# Patient Record
Sex: FEMALE | Race: WHITE
[De-identification: names, ages, dates, MRNs, and addresses within clinical notes are randomized per-mention and may not be internally consistent; named-entity substitution may affect disease eponyms.]

---

## 2024-09-12 ENCOUNTER — NON-APPOINTMENT (OUTPATIENT)
Age: 69
End: 2024-09-12

## 2024-09-13 ENCOUNTER — APPOINTMENT (OUTPATIENT)
Dept: OTOLARYNGOLOGY | Facility: CLINIC | Age: 69
End: 2024-09-13
Payer: MEDICARE

## 2024-09-13 VITALS
WEIGHT: 128 LBS | OXYGEN SATURATION: 96 % | HEIGHT: 64 IN | SYSTOLIC BLOOD PRESSURE: 119 MMHG | HEART RATE: 64 BPM | BODY MASS INDEX: 21.85 KG/M2 | DIASTOLIC BLOOD PRESSURE: 82 MMHG

## 2024-09-13 DIAGNOSIS — Z80.0 FAMILY HISTORY OF MALIGNANT NEOPLASM OF DIGESTIVE ORGANS: ICD-10-CM

## 2024-09-13 DIAGNOSIS — Z83.3 FAMILY HISTORY OF DIABETES MELLITUS: ICD-10-CM

## 2024-09-13 DIAGNOSIS — Z78.9 OTHER SPECIFIED HEALTH STATUS: ICD-10-CM

## 2024-09-13 DIAGNOSIS — Z86.79 PERSONAL HISTORY OF OTHER DISEASES OF THE CIRCULATORY SYSTEM: ICD-10-CM

## 2024-09-13 DIAGNOSIS — Z82.49 FAMILY HISTORY OF ISCHEMIC HEART DISEASE AND OTHER DISEASES OF THE CIRCULATORY SYSTEM: ICD-10-CM

## 2024-09-13 DIAGNOSIS — G47.33 OBSTRUCTIVE SLEEP APNEA (ADULT) (PEDIATRIC): ICD-10-CM

## 2024-09-13 PROCEDURE — 99204 OFFICE O/P NEW MOD 45 MIN: CPT | Mod: 25,57

## 2024-09-13 PROCEDURE — 31575 DIAGNOSTIC LARYNGOSCOPY: CPT

## 2024-09-13 RX ORDER — MAGNESIUM OXIDE/MAG AA CHELATE 300 MG
CAPSULE ORAL
Refills: 0 | Status: ACTIVE | COMMUNITY

## 2024-09-13 RX ORDER — ASCORBIC ACID 500 MG
TABLET ORAL
Refills: 0 | Status: ACTIVE | COMMUNITY

## 2024-09-13 RX ORDER — RIVAROXABAN 2.5 MG/1
TABLET, FILM COATED ORAL
Refills: 0 | Status: ACTIVE | COMMUNITY

## 2024-09-13 RX ORDER — STRONTIUM CHLORIDE HEXAHYDRATE 100 %
CRYSTALS MISCELLANEOUS
Refills: 0 | Status: ACTIVE | COMMUNITY

## 2024-09-13 RX ORDER — BIOTIN 10 MG
TABLET ORAL
Refills: 0 | Status: ACTIVE | COMMUNITY

## 2024-09-13 RX ORDER — POTASSIUM CHLORIDE 750 MG/1
10 TABLET, FILM COATED, EXTENDED RELEASE ORAL
Refills: 0 | Status: ACTIVE | COMMUNITY

## 2024-09-13 RX ORDER — VITAMIN E ACID SUCCINATE 268 MG
TABLET ORAL
Refills: 0 | Status: ACTIVE | COMMUNITY

## 2024-09-13 RX ORDER — COLD-HOT PACK
EACH MISCELLANEOUS
Refills: 0 | Status: ACTIVE | COMMUNITY

## 2024-09-13 RX ORDER — CHROMIUM 200 MCG
TABLET ORAL
Refills: 0 | Status: ACTIVE | COMMUNITY

## 2024-09-13 RX ORDER — ZOLPIDEM TARTRATE 5 MG/1
TABLET, FILM COATED ORAL
Refills: 0 | Status: ACTIVE | COMMUNITY

## 2024-09-13 RX ORDER — METOPROLOL TARTRATE 75 MG/1
TABLET, FILM COATED ORAL
Refills: 0 | Status: ACTIVE | COMMUNITY

## 2024-09-13 NOTE — REASON FOR VISIT
[Initial Consultation] : an initial consultation for [FreeTextEntry2] : referred by Dr. Jermaine Benson, pulmonologist, for obstructive sleep apnea

## 2024-09-13 NOTE — CONSULT LETTER
[Dear  ___] : Dear  [unfilled], [Consult Letter:] : I had the pleasure of evaluating your patient, [unfilled]. [Please see my note below.] : Please see my note below. [Consult Closing:] : Thank you very much for allowing me to participate in the care of this patient.  If you have any questions, please do not hesitate to contact me. [Sincerely,] : Sincerely, [FreeTextEntry2] : Jermaine Benson [FreeTextEntry3] : García Mc MD, JEANIE, FACS  Department Otolaryngology Director of [facility] Professor of Otolaryngology,  [facility] [place] [employer] [facility]

## 2024-09-13 NOTE — ADDENDUM
[FreeTextEntry1] :  scribe attestation; I, Be Cox, am scribing for and in the presence of Dr. García Mc in the following sections HISTORY OF PRESENT ILLNESS, PAST MEDICAL/FAMILY/SOCIAL HISTORY; REVIEW OF SYSTEMS; VITAL SIGNS; PHYSICAL EXAM; PROCEDURES; DISCUSSION.   I, Dr. García Mc, personally performed the services described in the documentation, reviewed the documentation recorded by the scribe in my presence, and it accurately and completely records my words and actions.

## 2024-09-13 NOTE — HISTORY OF PRESENT ILLNESS
[de-identified] : 69 year old female referred by Dr. Jermaine Benson, pulmonologist, for obstructive sleep apnea.  States had a sleep study June, 2024, we are awaiting results.  States she was told she has severe sleep apnea.   Denies CPAP. Patient states she has been on Flonase, with minimal relief.  [Snoring] : no snoring [Witnessed Apneas] : witnessed sleep apnea [Frequent Nocturnal Awakening] : frequent nocturnal awakening [Daytime Somnolence] : no daytime somnolence [Unintentional Sleep while Active] : no unintentional sleep while active [Unintentional Sleep While Inactive] : no unintentional sleep while inactive [Awakes Unrefreshed] : awakening unrefreshed [Awakes with Headache] : no headache upon awakening [Awakening With Dry Mouth] : awakening with dry mouth [Recent  Weight Gain] : recent weight gain

## 2024-09-13 NOTE — PROCEDURE
[Image(s) Captured] : image(s) captured and filed [Video Captured] : video captured and filed [Topical Lidocaine] : topical lidocaine [Oxymetazoline HCl] : oxymetazoline HCl [Flexible Endoscope] : examined with the flexible endoscope [Serial Number: ___] : Serial Number: [unfilled] [Unable to Cooperate with Mirror] : patient unable to cooperate with mirror [Complicated Symptoms] : complicated symptoms requiring more thorough examination than provided by mirror [Normal] : posterior cricoid area had healthy pink mucosa in the interarytenoid area and the esophageal inlet [de-identified] : Patient was placed in the examination chair in a sitting position. The nose was decongested with oxymetazoline nasal solution. The airway was anesthetized with 4% Xylocaine.  The back of the throat was anesthetized with Cetacaine. Direct flexible/rigid video endoscopy was performed. Findings revealed: deviated septum to the left, turbinate hypertrophy, larynx, epiglottis, and vocal cords are normal.

## 2024-09-18 DIAGNOSIS — J34.2 DEVIATED NASAL SEPTUM: ICD-10-CM

## 2024-12-04 ENCOUNTER — OUTPATIENT (OUTPATIENT)
Dept: OUTPATIENT SERVICES | Facility: HOSPITAL | Age: 69
LOS: 1 days | End: 2024-12-04
Payer: MEDICARE

## 2024-12-04 VITALS
HEART RATE: 66 BPM | OXYGEN SATURATION: 98 % | TEMPERATURE: 98 F | RESPIRATION RATE: 18 BRPM | DIASTOLIC BLOOD PRESSURE: 78 MMHG | WEIGHT: 127.43 LBS | SYSTOLIC BLOOD PRESSURE: 147 MMHG | HEIGHT: 65 IN

## 2024-12-04 DIAGNOSIS — I10 ESSENTIAL (PRIMARY) HYPERTENSION: ICD-10-CM

## 2024-12-04 DIAGNOSIS — Z98.890 OTHER SPECIFIED POSTPROCEDURAL STATES: Chronic | ICD-10-CM

## 2024-12-04 DIAGNOSIS — Z98.49 CATARACT EXTRACTION STATUS, UNSPECIFIED EYE: Chronic | ICD-10-CM

## 2024-12-04 DIAGNOSIS — I48.91 UNSPECIFIED ATRIAL FIBRILLATION: ICD-10-CM

## 2024-12-04 DIAGNOSIS — Z95.818 PRESENCE OF OTHER CARDIAC IMPLANTS AND GRAFTS: ICD-10-CM

## 2024-12-04 DIAGNOSIS — J34.2 DEVIATED NASAL SEPTUM: ICD-10-CM

## 2024-12-04 DIAGNOSIS — Z01.818 ENCOUNTER FOR OTHER PREPROCEDURAL EXAMINATION: ICD-10-CM

## 2024-12-04 DIAGNOSIS — G47.33 OBSTRUCTIVE SLEEP APNEA (ADULT) (PEDIATRIC): ICD-10-CM

## 2024-12-04 LAB
ANION GAP SERPL CALC-SCNC: 8 MMOL/L — SIGNIFICANT CHANGE UP (ref 5–17)
BUN SERPL-MCNC: 17 MG/DL — SIGNIFICANT CHANGE UP (ref 7–23)
CALCIUM SERPL-MCNC: 9.5 MG/DL — SIGNIFICANT CHANGE UP (ref 8.4–10.5)
CHLORIDE SERPL-SCNC: 104 MMOL/L — SIGNIFICANT CHANGE UP (ref 96–108)
CO2 SERPL-SCNC: 30 MMOL/L — SIGNIFICANT CHANGE UP (ref 22–31)
CREAT SERPL-MCNC: 0.89 MG/DL — SIGNIFICANT CHANGE UP (ref 0.5–1.3)
EGFR: 70 ML/MIN/1.73M2 — SIGNIFICANT CHANGE UP
GLUCOSE SERPL-MCNC: 84 MG/DL — SIGNIFICANT CHANGE UP (ref 70–99)
HCT VFR BLD CALC: 40.7 % — SIGNIFICANT CHANGE UP (ref 34.5–45)
HGB BLD-MCNC: 13.7 G/DL — SIGNIFICANT CHANGE UP (ref 11.5–15.5)
MCHC RBC-ENTMCNC: 31.4 PG — SIGNIFICANT CHANGE UP (ref 27–34)
MCHC RBC-ENTMCNC: 33.7 G/DL — SIGNIFICANT CHANGE UP (ref 32–36)
MCV RBC AUTO: 93.3 FL — SIGNIFICANT CHANGE UP (ref 80–100)
NRBC # BLD: 0 /100 WBCS — SIGNIFICANT CHANGE UP (ref 0–0)
PLATELET # BLD AUTO: 348 K/UL — SIGNIFICANT CHANGE UP (ref 150–400)
POTASSIUM SERPL-MCNC: 4.3 MMOL/L — SIGNIFICANT CHANGE UP (ref 3.5–5.3)
POTASSIUM SERPL-SCNC: 4.3 MMOL/L — SIGNIFICANT CHANGE UP (ref 3.5–5.3)
RBC # BLD: 4.36 M/UL — SIGNIFICANT CHANGE UP (ref 3.8–5.2)
RBC # FLD: 12.5 % — SIGNIFICANT CHANGE UP (ref 10.3–14.5)
SODIUM SERPL-SCNC: 142 MMOL/L — SIGNIFICANT CHANGE UP (ref 135–145)
WBC # BLD: 4.05 K/UL — SIGNIFICANT CHANGE UP (ref 3.8–10.5)
WBC # FLD AUTO: 4.05 K/UL — SIGNIFICANT CHANGE UP (ref 3.8–10.5)

## 2024-12-04 PROCEDURE — 85027 COMPLETE CBC AUTOMATED: CPT

## 2024-12-04 PROCEDURE — 80048 BASIC METABOLIC PNL TOTAL CA: CPT

## 2024-12-04 PROCEDURE — G0463: CPT

## 2024-12-04 PROCEDURE — 36415 COLL VENOUS BLD VENIPUNCTURE: CPT

## 2024-12-04 RX ORDER — 0.9 % SODIUM CHLORIDE 0.9 %
1000 INTRAVENOUS SOLUTION INTRAVENOUS
Refills: 0 | Status: DISCONTINUED | OUTPATIENT
Start: 2024-12-09 | End: 2024-12-23

## 2024-12-04 NOTE — H&P PST ADULT - NSICDXPASTSURGICALHX_GEN_ALL_CORE_FT
PAST SURGICAL HISTORY:  History of bunionectomy of left great toe     History of cataract surgery     S/P LASIK surgery

## 2024-12-04 NOTE — H&P PST ADULT - NSICDXFAMILYHX_GEN_ALL_CORE_FT
FAMILY HISTORY:  Family history of diabetes mellitus (DM)  Family history of heart disease  Family history stroke in brother  FH: HTN (hypertension)

## 2024-12-04 NOTE — H&P PST ADULT - PROBLEM SELECTOR PLAN 1
Patient provided with pre-operative instructions and verbalized understanding.  Patient can take metoprolol but otherwise will be NPO on day of surgery. Patient will stop NSAIDs, aspirin, herbal supplements or vitamins 1 week prior to surgery.     Pending cardiac clearance as per surgeon.

## 2024-12-04 NOTE — H&P PST ADULT - HISTORY OF PRESENT ILLNESS
Patient is a [age] year old M/F with PMHx of __ or no pertinent medical history presents for perioperative testing for _____ with  ___ scheduled for /2024. Reports___. Denies ___ or any acute symptoms at this time. Patient otherwise feels well overall.    Patient is a 69 year old F presents for perioperative testing for septoplasty, turbinectomy, DISE with Dr. Mc scheduled for 12/09/2024. Reports having sleep apnea, unable to tolerate CPAP, therefore having upcoming surgery to help her breathe through her nose and possible future inspire placement. Deniesany acute symptoms at this time. Patient otherwise feels well overall.

## 2024-12-04 NOTE — H&P PST ADULT - NSICDXPASTMEDICALHX_GEN_ALL_CORE_FT
PAST MEDICAL HISTORY:  Afib     DNS (deviated nasal septum)     HTN (hypertension)     Implantable loop recorder present     MICHAEL (obstructive sleep apnea)

## 2024-12-04 NOTE — H&P PST ADULT - PROBLEM SELECTOR PLAN 4
continue medications as prescribed.     last dose XARELTO 11/25 as pe paperwork provided by surgeon- stated by pt

## 2024-12-09 ENCOUNTER — APPOINTMENT (OUTPATIENT)
Dept: OTOLARYNGOLOGY | Facility: HOSPITAL | Age: 69
End: 2024-12-09
Payer: MEDICARE

## 2024-12-09 ENCOUNTER — TRANSCRIPTION ENCOUNTER (OUTPATIENT)
Age: 69
End: 2024-12-09

## 2024-12-09 ENCOUNTER — OUTPATIENT (OUTPATIENT)
Dept: OUTPATIENT SERVICES | Facility: HOSPITAL | Age: 69
LOS: 1 days | End: 2024-12-09
Payer: MEDICARE

## 2024-12-09 ENCOUNTER — RESULT REVIEW (OUTPATIENT)
Age: 69
End: 2024-12-09

## 2024-12-09 VITALS
DIASTOLIC BLOOD PRESSURE: 81 MMHG | WEIGHT: 127.43 LBS | SYSTOLIC BLOOD PRESSURE: 120 MMHG | RESPIRATION RATE: 15 BRPM | HEART RATE: 60 BPM | OXYGEN SATURATION: 97 % | HEIGHT: 65 IN | TEMPERATURE: 98 F

## 2024-12-09 DIAGNOSIS — Z98.890 OTHER SPECIFIED POSTPROCEDURAL STATES: Chronic | ICD-10-CM

## 2024-12-09 DIAGNOSIS — G47.33 OBSTRUCTIVE SLEEP APNEA (ADULT) (PEDIATRIC): ICD-10-CM

## 2024-12-09 DIAGNOSIS — Z98.49 CATARACT EXTRACTION STATUS, UNSPECIFIED EYE: Chronic | ICD-10-CM

## 2024-12-09 PROCEDURE — 30130 EXCISE INFERIOR TURBINATE: CPT | Mod: 50

## 2024-12-09 PROCEDURE — 42975 DISE EVAL SLP DO BRTH FLX DX: CPT

## 2024-12-09 PROCEDURE — ZZZZZ: CPT

## 2024-12-09 PROCEDURE — 88300 SURGICAL PATH GROSS: CPT | Mod: 26

## 2024-12-09 PROCEDURE — 30520 REPAIR OF NASAL SEPTUM: CPT

## 2024-12-09 RX ORDER — INFLUENZA VIRUS VACCINE 15; 15; 15; 15 UG/.5ML; UG/.5ML; UG/.5ML; UG/.5ML
0.5 SUSPENSION INTRAMUSCULAR ONCE
Refills: 0 | Status: DISCONTINUED | OUTPATIENT
Start: 2024-12-09 | End: 2024-12-23

## 2024-12-09 RX ORDER — METOPROLOL TARTRATE 100 MG/1
50 TABLET, FILM COATED ORAL DAILY
Refills: 0 | Status: DISCONTINUED | OUTPATIENT
Start: 2024-12-09 | End: 2024-12-23

## 2024-12-09 RX ORDER — ONDANSETRON HYDROCHLORIDE 4 MG/1
4 TABLET, FILM COATED ORAL ONCE
Refills: 0 | Status: DISCONTINUED | OUTPATIENT
Start: 2024-12-09 | End: 2024-12-09

## 2024-12-09 RX ORDER — ACETAMINOPHEN 500MG 500 MG/1
650 TABLET, COATED ORAL EVERY 6 HOURS
Refills: 0 | Status: DISCONTINUED | OUTPATIENT
Start: 2024-12-09 | End: 2024-12-23

## 2024-12-09 RX ORDER — CEFAZOLIN SODIUM 10 G
2000 VIAL (EA) INJECTION EVERY 8 HOURS
Refills: 0 | Status: COMPLETED | OUTPATIENT
Start: 2024-12-09 | End: 2024-12-10

## 2024-12-09 RX ORDER — METOPROLOL TARTRATE 100 MG/1
1 TABLET, FILM COATED ORAL
Refills: 0 | DISCHARGE

## 2024-12-09 RX ORDER — CEPHALEXIN 500 MG
1 CAPSULE ORAL
Qty: 28 | Refills: 0
Start: 2024-12-09 | End: 2024-12-15

## 2024-12-09 RX ORDER — TRAMADOL HYDROCHLORIDE 300 MG/1
1 CAPSULE ORAL
Qty: 12 | Refills: 0
Start: 2024-12-09 | End: 2024-12-11

## 2024-12-09 RX ORDER — BENZOCAINE, MENTHOL 15; 3.6 MG/1; MG/1
1 LOZENGE ORAL ONCE
Refills: 0 | Status: COMPLETED | OUTPATIENT
Start: 2024-12-09 | End: 2024-12-09

## 2024-12-09 RX ORDER — 0.9 % SODIUM CHLORIDE 0.9 %
1000 INTRAVENOUS SOLUTION INTRAVENOUS
Refills: 0 | Status: DISCONTINUED | OUTPATIENT
Start: 2024-12-09 | End: 2024-12-09

## 2024-12-09 RX ORDER — HYDROMORPHONE HYDROCHLORIDE 2 MG/1
0.5 TABLET ORAL
Refills: 0 | Status: DISCONTINUED | OUTPATIENT
Start: 2024-12-09 | End: 2024-12-09

## 2024-12-09 RX ADMIN — Medication 100 MILLIGRAM(S): at 22:52

## 2024-12-09 RX ADMIN — BENZOCAINE, MENTHOL 1 LOZENGE: 15; 3.6 LOZENGE ORAL at 15:23

## 2024-12-09 RX ADMIN — Medication 50 MILLILITER(S): at 10:29

## 2024-12-09 NOTE — ASU PREOP CHECKLIST - TAMPON REMOVED
Class I (easy) - visualization of the soft palate, fauces, uvula, and both anterior and posterior pillars
n/a
Class II - visualization of the soft palate, fauces, and uvula
Class II - visualization of the soft palate, fauces, and uvula

## 2024-12-09 NOTE — BRIEF OPERATIVE NOTE - NSICDXBRIEFPROCEDURE_GEN_ALL_CORE_FT
PROCEDURES:  Drug induced sleep endoscopy 09-Dec-2024 14:13:12  Prakash Sahu  Nasal septoplasty with turbinectomy 09-Dec-2024 14:13:18  Prakash Sahu

## 2024-12-09 NOTE — PATIENT PROFILE ADULT - CONTRAINDICATIONS & PRECAUTIONS (SELECT ALL THAT APPLY)
Pulmonary medicine follow-up note:  Acute hypoxemic respiratory failure secondary to pulmonary edema   History of obstructive sleep apnea syndrome, on CPAP therapy chronically   Hypervolemia    SUBJECTIVE: Sitting up to chair.  Using IS.  Feels well.  No pain. Denies SOB.  Having PPM placed today.    REVIEW OF SYSTEMS: A 6 point review ofsystems was performed at the bedside and is otherwise negative.       OBJECTIVE:   VITALS:     Vital Last Value 24 Hour Range   Temperature 99.3 °F (37.4 °C) Temp  Min: 97.2 °F (36.2 °C)  Max: 99.3 °F (37.4 °C)   Pulse 80 Pulse  Min: 79  Max: 80   Respiratory Rate 18 Resp  Min: 16  Max: 43   Non-Invasive   Blood Pressure 137/73 (12/26/17 0900) BP  Min: 128/71  Max: 165/76   Arterial  Blood Pressure 162/70 (12/22/17 1200) No Data Recorded   Pulse Oximetry 97 % SpO2  Min: 85 %  Max: 97 %     O2 2 L/min (Simultaneous filing. User may not have seen previous data.)     FiO2 40 %       GENERAL:  NAD. Obese.  Sitting up to chair.  HEENT:  No icterus.  Pupils equal.  Oropharynx clear.  Mallampati 4 airway. Full face and neck no SQ air  NECK:  No goiter.    CV:  No JVD.  RRR. S1 and S2. No S3 or murmurs.  No edema  LUNGS:  Diminished breath sounds bilaterally. No wheezes, no rhonchi. No tachypnea.  No accessory use.decreased bs bilat  ABDOMEN:  Soft. NT. ND. +BS  SKIN:Warm.  No rashes.  NEURO:  AAO x 3  Pt seen after PPM placed, he c/o being cold, no dyspnea.MEDS:   • mupirocin  1 application Each Nare 2 times per day   • ceFAZolin  3,000 mg Intravenous On Call to Procedure   • vancomycin (VANCOCIN) IVPB  2,000 mg Intravenous On Call to Procedure   • warfarin  2 mg Oral Once   • amLODIPine  10 mg Oral Daily   • WARFARIN - PHYSICIAN MONITORED 1 each  1 each Does not apply Q Evening   • furosemide  40 mg Intravenous BID   • sodium chloride (PF)  2 mL Injection 2 times per day   • insulin lispro   Subcutaneous TID AC   • atorvastatin  20 mg Oral Daily   • finasteride  5 mg Oral Daily   • folic  acid  400 mcg Oral Daily   • indapamide  1.25 mg Oral Daily   • tamsulosin  0.4 mg Oral Daily   • aspirin  81 mg Oral Daily   • docusate sodium-sennosides  2 tablet Oral Daily   • sodium biphosphate  1 enema Rectal Once   • polyethylene glycol  17 g Oral BID   • famotidine  20 mg Oral 2 times per day   • pneumococcal 23-valent vaccine  0.5 mL Intramuscular Once       LABS:  Lab Results   Component Value Date    SODIUM 141 12/26/2017    POTASSIUM 3.7 12/26/2017    CHLORIDE 99 12/26/2017    CO2 34 (H) 12/26/2017    BUN 43 (H) 12/26/2017    CREATININE 1.07 12/26/2017    GLUCOSE 132 (H) 12/26/2017    WBC 9.5 12/26/2017    HGB 12.8 (L) 12/26/2017    HCT 39.8 12/26/2017     12/26/2017    PT 11.2 12/26/2017    INR 1.1 12/26/2017    BILIRUBIN 0.5 12/21/2017    ALKPT 82 12/21/2017    AST 18 12/21/2017    GPT 36 12/21/2017       Lab Results   Component Value Date    APH 7.43 12/23/2017    APO2 62 (L) 12/23/2017    ASAT 92 (L) 12/21/2017    FIO2 3 12/22/2017    APCO2 45 12/23/2017    AHCO3 29 (H) 12/23/2017       IMAGING: No new imaging CXR post PPM shows no PNTX, heart remains enlarged, central pulm vessels prominent    ASSESSMENT:  Acute hypoxemic respiratory failure secondary to pulmonary edema, no evidence of intrinsic lung Dz.    Severe AS, S/p TAVR post op day 3  Obesity, SANDOVAL, CPAP compliant   Atrial fibrillation   DM  Life long non smoker    PLAN:  Continue diuresis  Incentive spirometry  CPAP  Increase activity level  Wean O2 if able keep sats greater than 90%    LETICIA Husain  Page [phone] from [phone]  after 1600 and on weekends, please page on-call MD through answering service.    Note reviewed and discussed with NP.  Concur with findings.  See my additional comments in bold type above.    Dariel Trujillo M.D.  [facility]  12/26/2017   none...

## 2024-12-09 NOTE — PATIENT PROFILE ADULT - FALL HARM RISK - HARM RISK INTERVENTIONS

## 2024-12-09 NOTE — ASU PREOP CHECKLIST - AS TEMP SITE
patient states he received a phone call telling him to return to hospital for IV antibiotics.  he denies fever/chills.  States he is feeling better. tympanic

## 2024-12-09 NOTE — ASU PATIENT PROFILE, ADULT - FALL HARM RISK - HARM RISK INTERVENTIONS

## 2024-12-09 NOTE — BRIEF OPERATIVE NOTE - NSICDXBRIEFPOSTOP_GEN_ALL_CORE_FT
POST-OP DIAGNOSIS:  MICHAEL (obstructive sleep apnea) 09-Dec-2024 14:14:03  Prakash Sahu  Deviated septum 09-Dec-2024 14:14:10  Prakash Sahu  Nasal turbinate hypertrophy 09-Dec-2024 14:14:16  Prakash Sahu

## 2024-12-09 NOTE — BRIEF OPERATIVE NOTE - NSICDXBRIEFPREOP_GEN_ALL_CORE_FT
PRE-OP DIAGNOSIS:  MICHAEL (obstructive sleep apnea) 09-Dec-2024 14:13:32  Prakash Sahu  Deviated septum 09-Dec-2024 14:13:39  Prakash Sahu  Nasal turbinate hypertrophy 09-Dec-2024 14:13:48  Prakash Sahu

## 2024-12-10 ENCOUNTER — TRANSCRIPTION ENCOUNTER (OUTPATIENT)
Age: 69
End: 2024-12-10

## 2024-12-10 VITALS
RESPIRATION RATE: 16 BRPM | TEMPERATURE: 98 F | OXYGEN SATURATION: 95 % | HEART RATE: 57 BPM | SYSTOLIC BLOOD PRESSURE: 127 MMHG | DIASTOLIC BLOOD PRESSURE: 78 MMHG

## 2024-12-10 PROCEDURE — 30140 RESECT INFERIOR TURBINATE: CPT | Mod: 50

## 2024-12-10 PROCEDURE — 30520 REPAIR OF NASAL SEPTUM: CPT

## 2024-12-10 PROCEDURE — C9399: CPT

## 2024-12-10 PROCEDURE — 42975 DISE EVAL SLP DO BRTH FLX DX: CPT

## 2024-12-10 PROCEDURE — 88300 SURGICAL PATH GROSS: CPT

## 2024-12-10 RX ORDER — ACETAMINOPHEN, DIPHENHYDRAMINE HCL, PHENYLEPHRINE HCL 325; 25; 5 MG/1; MG/1; MG/1
3 TABLET ORAL ONCE
Refills: 0 | Status: COMPLETED | OUTPATIENT
Start: 2024-12-10 | End: 2024-12-10

## 2024-12-10 RX ORDER — ACETAMINOPHEN 500MG 500 MG/1
1000 TABLET, COATED ORAL ONCE
Refills: 0 | Status: COMPLETED | OUTPATIENT
Start: 2024-12-10 | End: 2024-12-10

## 2024-12-10 RX ORDER — RIVAROXABAN 10 MG/1
1 TABLET, FILM COATED ORAL
Refills: 0 | DISCHARGE

## 2024-12-10 RX ADMIN — Medication 100 MILLIGRAM(S): at 05:55

## 2024-12-10 RX ADMIN — ACETAMINOPHEN 500MG 1000 MILLIGRAM(S): 500 TABLET, COATED ORAL at 01:16

## 2024-12-10 RX ADMIN — METOPROLOL TARTRATE 50 MILLIGRAM(S): 100 TABLET, FILM COATED ORAL at 05:55

## 2024-12-10 RX ADMIN — ACETAMINOPHEN, DIPHENHYDRAMINE HCL, PHENYLEPHRINE HCL 3 MILLIGRAM(S): 325; 25; 5 TABLET ORAL at 01:28

## 2024-12-10 RX ADMIN — ACETAMINOPHEN 500MG 400 MILLIGRAM(S): 500 TABLET, COATED ORAL at 00:46

## 2024-12-10 NOTE — ASU DISCHARGE PLAN (ADULT/PEDIATRIC) - NS MD DC FALL RISK RISK
For information on Fall & Injury Prevention, visit: [email or web address] OR  [email or web address] OR  https://www.cdc.gov/steadi/patient.html

## 2024-12-10 NOTE — ASU DISCHARGE PLAN (ADULT/PEDIATRIC) - FINANCIAL ASSISTANCE
[facility] provides services at a reduced cost to those who are determined to be eligible through [facility]’s financial assistance program. Information regarding [facility]’s financial assistance program can be found by going to [email or web address] or by calling [phone].

## 2024-12-10 NOTE — ASU DISCHARGE PLAN (ADULT/PEDIATRIC) - ASU DC SPECIAL INSTRUCTIONSFT
Please refer to discharge instructions given to you    May shower. Do not insert anything in your nose. Avoid vigorous blowing of your nose and sneezing. Avoid hitting nose against anything. No heavy lifting or strenuous activities. Eat and drink luke warm to cold drinks/foods, no hot/steaming drinks or foods. Sleep with head elevated and on your back.     Take pain meds and antibiotics as prescribed. May take over the counter Tylenol 650-975mg every 6 hours alternating with Motrin 600mg every 6 hours as needed for pain. Can take additional Oxycodone as prescribed for severe pain as needed. Do not drive while taking prescribed narcotic pain medications.    Follow up with Dr. Mc in 1-2 weeks, call office to schedule appointment. May call physician sooner with any questions.     Please notify MD sooner or return to ER with any new or worsening symptoms, unable to breathe, uncontrollable pain, foul smelling discharge or drainage from wound, excessive bleeding or swellin, or other concerns/problems. Please restart Xarelto in one week 12/17/24    Please refer to discharge instructions given to you    May shower. Do not insert anything in your nose. Avoid vigorous blowing of your nose and sneezing. Avoid hitting nose against anything. No heavy lifting or strenuous activities. Eat and drink luke warm to cold drinks/foods, no hot/steaming drinks or foods. Sleep with head elevated and on your back.     Take pain meds and antibiotics as prescribed. May take over the counter Tylenol 650-975mg every 6 hours alternating with Motrin 600mg every 6 hours as needed for pain. Can take additional Oxycodone as prescribed for severe pain as needed. Do not drive while taking prescribed narcotic pain medications.    Follow up with Dr. Mc in 1-2 weeks, call office to schedule appointment. May call physician sooner with any questions.     Please notify MD sooner or return to ER with any new or worsening symptoms, unable to breathe, uncontrollable pain, foul smelling discharge or drainage from wound, excessive bleeding or swelling, or other concerns/problems.

## 2024-12-10 NOTE — PROGRESS NOTE ADULT - SUBJECTIVE AND OBJECTIVE BOX
INTERVAL HPI/OVERNIGHT EVENTS:  POD#1 Septoplasty, Turbinectomy, DISE 12/9  Pt. seen and examined at bedside resting comfortably.  Denies fever/chills, chest pain, dyspnea, cough, dizziness.   Packing removed at bedside and mustache dressing placed     Vital Signs Last 24 Hrs  T(C): 36.4 (10 Dec 2024 05:52), Max: 36.8 (09 Dec 2024 10:05)  T(F): 97.5 (10 Dec 2024 05:52), Max: 98.3 (09 Dec 2024 10:05)  HR: 56 (10 Dec 2024 05:52) (48 - 63)  BP: 112/68 (10 Dec 2024 05:52) (112/68 - 177/95)  BP(mean): --  RR: 16 (10 Dec 2024 05:52) (12 - 16)  SpO2: 95% (10 Dec 2024 05:52) (93% - 100%)    Parameters below as of 10 Dec 2024 05:52  Patient On (Oxygen Delivery Method): room air        PHYSICAL EXAM:    GENERAL: NAD  HEAD:  Atraumatic, Normocephalic  ENT: Packing removed at bedside and mustache dressing placed   CHEST/LUNG: Equal and bilateral chest rise and fall  HEART: Not tachycardic   ABDOMEN: non distended, soft, non tender, no guarding    I&O's Detail    09 Dec 2024 07:01  -  10 Dec 2024 07:00  --------------------------------------------------------  IN:    Lactated Ringers: 150 mL    Oral Fluid: 474 mL  Total IN: 624 mL    OUT:    Voided (mL): 1 mL  Total OUT: 1 mL    Total NET: 623 mL          LABS:                type    RADIOLOGY & ADDITIONAL STUDIES:    Impression: 69y Female POD#1 Septoplasty, Turbinectomy, DISE 12/9    Plan:  Written instruction given to patient  Patient can restart Xarelto in one week  Packing removed and mustache dressing placed  Medications sent home  Discharge home today    Patient discussed with CELESTINE Sahu

## 2024-12-10 NOTE — DISCHARGE NOTE NURSING/CASE MANAGEMENT/SOCIAL WORK - PATIENT PORTAL LINK FT
You can access the FollowMyHealth Patient Portal offered by [facility] by registering at the following website: [email or web address]. By joining [email or web address]’s FollowMyHealth portal, you will also be able to view your health information using other applications (apps) compatible with our system.

## 2024-12-10 NOTE — DISCHARGE NOTE NURSING/CASE MANAGEMENT/SOCIAL WORK - NSDCPEFALRISK_GEN_ALL_CORE
For information on Fall & Injury Prevention, visit: [email or web address] OR  [email or web address] OR  https://www.cdc.gov/steadi/patient.html

## 2024-12-10 NOTE — DISCHARGE NOTE NURSING/CASE MANAGEMENT/SOCIAL WORK - FINANCIAL ASSISTANCE
[facility] provides services at a reduced cost to those who are determined to be eligible through [facility]’s financial assistance program. Information regarding [facility]’s financial assistance program can be found by going to [email or web address] or by calling [phone].

## 2024-12-11 LAB — SURGICAL PATHOLOGY STUDY: SIGNIFICANT CHANGE UP

## 2024-12-18 ENCOUNTER — APPOINTMENT (OUTPATIENT)
Dept: OTOLARYNGOLOGY | Facility: CLINIC | Age: 69
End: 2024-12-18
Payer: MEDICARE

## 2024-12-18 PROCEDURE — 99024 POSTOP FOLLOW-UP VISIT: CPT

## 2024-12-30 PROBLEM — I48.91 UNSPECIFIED ATRIAL FIBRILLATION: Chronic | Status: ACTIVE | Noted: 2024-12-04

## 2024-12-30 PROBLEM — J34.2 DEVIATED NASAL SEPTUM: Chronic | Status: ACTIVE | Noted: 2024-12-04

## 2024-12-30 PROBLEM — I10 ESSENTIAL (PRIMARY) HYPERTENSION: Chronic | Status: ACTIVE | Noted: 2024-12-04

## 2024-12-30 PROBLEM — Z95.818 PRESENCE OF OTHER CARDIAC IMPLANTS AND GRAFTS: Chronic | Status: ACTIVE | Noted: 2024-12-04

## 2024-12-30 PROBLEM — G47.33 OBSTRUCTIVE SLEEP APNEA (ADULT) (PEDIATRIC): Chronic | Status: ACTIVE | Noted: 2024-12-04

## 2025-03-12 ENCOUNTER — APPOINTMENT (OUTPATIENT)
Dept: PULMONOLOGY | Facility: CLINIC | Age: 70
End: 2025-03-12
Payer: MEDICARE

## 2025-03-12 VITALS
RESPIRATION RATE: 15 BRPM | WEIGHT: 127.25 LBS | OXYGEN SATURATION: 94 % | DIASTOLIC BLOOD PRESSURE: 82 MMHG | SYSTOLIC BLOOD PRESSURE: 132 MMHG | HEIGHT: 64 IN | TEMPERATURE: 57 F | BODY MASS INDEX: 21.72 KG/M2 | HEART RATE: 57 BPM

## 2025-03-12 DIAGNOSIS — F51.04 PSYCHOPHYSIOLOGIC INSOMNIA: ICD-10-CM

## 2025-03-12 DIAGNOSIS — G47.33 OBSTRUCTIVE SLEEP APNEA (ADULT) (PEDIATRIC): ICD-10-CM

## 2025-03-12 DIAGNOSIS — G47.00 INSOMNIA, UNSPECIFIED: ICD-10-CM

## 2025-03-12 PROCEDURE — G2211 COMPLEX E/M VISIT ADD ON: CPT

## 2025-03-12 PROCEDURE — 99204 OFFICE O/P NEW MOD 45 MIN: CPT

## 2025-03-12 RX ORDER — MIRTAZAPINE 7.5 MG/1
7.5 TABLET, FILM COATED ORAL
Qty: 30 | Refills: 3 | Status: ACTIVE | COMMUNITY
Start: 2025-03-12 | End: 1900-01-01

## 2025-04-16 ENCOUNTER — APPOINTMENT (OUTPATIENT)
Dept: SLEEP CENTER | Facility: CLINIC | Age: 70
End: 2025-04-16

## (undated) DEVICE — SYR LUER LOK 10CC

## (undated) DEVICE — ELCTR GROUNDING PAD ADULT COVIDIEN

## (undated) DEVICE — Device

## (undated) DEVICE — PREP BETADINE KIT

## (undated) DEVICE — WARMING BLANKET LOWER ADULT

## (undated) DEVICE — POSITIONER STRAP ARMBOARD VELCRO TS-30

## (undated) DEVICE — ELCTR STRYKER NEPTUNE SMOKE EVACUATION PENCIL (GREEN)

## (undated) DEVICE — TUBING SUCTION NONCONDUCTIVE 6MM X 12FT

## (undated) DEVICE — FRAZIER SUCTION TIP 10FR

## (undated) DEVICE — PACK SMR

## (undated) DEVICE — DRAPE SPLIT SHEET 77" X 108"

## (undated) DEVICE — VENODYNE/SCD SLEEVE CALF MEDIUM

## (undated) DEVICE — GLV 7.5 PROTEXIS (WHITE)